# Patient Record
Sex: MALE | Race: WHITE | NOT HISPANIC OR LATINO | Employment: OTHER
[De-identification: names, ages, dates, MRNs, and addresses within clinical notes are randomized per-mention and may not be internally consistent; named-entity substitution may affect disease eponyms.]

---

## 2017-05-30 ENCOUNTER — RECORDS - HEALTHEAST (OUTPATIENT)
Dept: ADMINISTRATIVE | Facility: OTHER | Age: 37
End: 2017-05-30

## 2017-05-31 ENCOUNTER — RECORDS - HEALTHEAST (OUTPATIENT)
Dept: ADMINISTRATIVE | Facility: OTHER | Age: 37
End: 2017-05-31

## 2017-08-08 ENCOUNTER — OFFICE VISIT - HEALTHEAST (OUTPATIENT)
Dept: FAMILY MEDICINE | Facility: CLINIC | Age: 37
End: 2017-08-08

## 2017-08-08 DIAGNOSIS — M54.50 BILATERAL LOW BACK PAIN WITHOUT SCIATICA, UNSPECIFIED CHRONICITY: ICD-10-CM

## 2017-08-08 DIAGNOSIS — Z98.890 S/P LUMBAR MICRODISCECTOMY: ICD-10-CM

## 2017-11-27 ENCOUNTER — OFFICE VISIT - HEALTHEAST (OUTPATIENT)
Dept: FAMILY MEDICINE | Facility: CLINIC | Age: 37
End: 2017-11-27

## 2017-11-27 ENCOUNTER — RECORDS - HEALTHEAST (OUTPATIENT)
Dept: GENERAL RADIOLOGY | Facility: CLINIC | Age: 37
End: 2017-11-27

## 2017-11-27 ENCOUNTER — COMMUNICATION - HEALTHEAST (OUTPATIENT)
Dept: FAMILY MEDICINE | Facility: CLINIC | Age: 37
End: 2017-11-27

## 2017-11-27 DIAGNOSIS — R52 BODY ACHES: ICD-10-CM

## 2017-11-27 DIAGNOSIS — R52 PAIN, UNSPECIFIED: ICD-10-CM

## 2018-02-13 ENCOUNTER — COMMUNICATION - HEALTHEAST (OUTPATIENT)
Dept: FAMILY MEDICINE | Facility: CLINIC | Age: 38
End: 2018-02-13

## 2018-08-31 ENCOUNTER — RECORDS - HEALTHEAST (OUTPATIENT)
Dept: ADMINISTRATIVE | Facility: OTHER | Age: 38
End: 2018-08-31

## 2018-10-10 ENCOUNTER — RECORDS - HEALTHEAST (OUTPATIENT)
Dept: ADMINISTRATIVE | Facility: OTHER | Age: 38
End: 2018-10-10

## 2019-04-29 ENCOUNTER — RECORDS - HEALTHEAST (OUTPATIENT)
Dept: ADMINISTRATIVE | Facility: OTHER | Age: 39
End: 2019-04-29

## 2019-07-19 ENCOUNTER — RECORDS - HEALTHEAST (OUTPATIENT)
Dept: ADMINISTRATIVE | Facility: OTHER | Age: 39
End: 2019-07-19

## 2019-09-23 ENCOUNTER — RECORDS - HEALTHEAST (OUTPATIENT)
Dept: ADMINISTRATIVE | Facility: OTHER | Age: 39
End: 2019-09-23

## 2020-01-22 ENCOUNTER — COMMUNICATION - HEALTHEAST (OUTPATIENT)
Dept: SCHEDULING | Facility: CLINIC | Age: 40
End: 2020-01-22

## 2020-01-22 ENCOUNTER — OFFICE VISIT - HEALTHEAST (OUTPATIENT)
Dept: FAMILY MEDICINE | Facility: CLINIC | Age: 40
End: 2020-01-22

## 2020-01-22 DIAGNOSIS — R52 BODY ACHES: ICD-10-CM

## 2020-01-22 DIAGNOSIS — R05.9 COUGH: ICD-10-CM

## 2020-01-22 DIAGNOSIS — J11.1 INFLUENZA-LIKE ILLNESS: ICD-10-CM

## 2020-01-22 LAB
FLUAV AG SPEC QL IA: NORMAL
FLUBV AG SPEC QL IA: NORMAL

## 2020-01-22 ASSESSMENT — MIFFLIN-ST. JEOR: SCORE: 1692.37

## 2021-01-17 ENCOUNTER — AMBULATORY - HEALTHEAST (OUTPATIENT)
Dept: VASCULAR SURGERY | Facility: CLINIC | Age: 41
End: 2021-01-17

## 2021-01-17 DIAGNOSIS — Z20.822 COVID-19 RULED OUT: ICD-10-CM

## 2021-02-10 ENCOUNTER — AMBULATORY - HEALTHEAST (OUTPATIENT)
Dept: LAB | Facility: CLINIC | Age: 41
End: 2021-02-10

## 2021-02-10 DIAGNOSIS — Z13.9 SCREENING FOR CONDITION: ICD-10-CM

## 2021-02-10 DIAGNOSIS — Z20.822 COVID-19 RULED OUT: ICD-10-CM

## 2021-02-10 LAB
SARS-COV-2 PCR COMMENT: NORMAL
SARS-COV-2 RNA SPEC QL NAA+PROBE: NEGATIVE
SARS-COV-2 VIRUS SPECIMEN SOURCE: NORMAL

## 2021-02-11 ENCOUNTER — COMMUNICATION - HEALTHEAST (OUTPATIENT)
Dept: SCHEDULING | Facility: CLINIC | Age: 41
End: 2021-02-11

## 2021-02-13 ENCOUNTER — COMMUNICATION - HEALTHEAST (OUTPATIENT)
Dept: SCHEDULING | Facility: CLINIC | Age: 41
End: 2021-02-13

## 2021-05-31 VITALS — BODY MASS INDEX: 25.99 KG/M2 | WEIGHT: 176 LBS

## 2021-05-31 VITALS — BODY MASS INDEX: 24.81 KG/M2 | WEIGHT: 168 LBS

## 2021-06-03 ENCOUNTER — RECORDS - HEALTHEAST (OUTPATIENT)
Dept: ADMINISTRATIVE | Facility: OTHER | Age: 41
End: 2021-06-03

## 2021-06-04 VITALS
TEMPERATURE: 99.2 F | RESPIRATION RATE: 16 BRPM | DIASTOLIC BLOOD PRESSURE: 60 MMHG | OXYGEN SATURATION: 95 % | HEIGHT: 68 IN | SYSTOLIC BLOOD PRESSURE: 104 MMHG | HEART RATE: 86 BPM | BODY MASS INDEX: 26.83 KG/M2 | WEIGHT: 177 LBS

## 2021-06-05 NOTE — PROGRESS NOTES
"SUBJECTIVE:   Johny Bui is a 39 y.o. male who present complaining of flu-like symptoms: fevers, chills, myalgias, congestion, sore throat and cough for less than 1 day. Denies dyspnea or wheezing.    OBJECTIVE: /60   Pulse 86   Temp 99.2  F (37.3  C) (Oral)   Resp 16   Ht 5' 8\" (1.727 m)   Wt 177 lb (80.3 kg)   SpO2 95%   BMI 26.91 kg/m     Appears moderately ill but not toxic; temperature as noted in vitals. Ears normal. Throat and pharynx normal.  Neck supple. No adenopathy in the neck. Sinuses non tender. The chest is clear.    Flu swab negative  Cbc - declined  cxr - declined    ASSESSMENT:  Influenza like illness     PLAN:  tamiflu given to prevent spread as he has small kids. Call or return to clinic prn if these symptoms worsen or fail to improve as anticipated.    "

## 2021-06-05 NOTE — TELEPHONE ENCOUNTER
Patient states ;  Started feeling ill with a slight cough yesterday.  He woke up with a greater cough, and coughed up whitish phlegm with   Pink tint to the phlegm.    Now C/O   Body aches, shivers.  Coughing up chunks, white with pink tint.(sputum)  No fever.  Took advil 800mg  just now.  Patient asking to be seen by provider.    Appointment made at 0840 @ [facility].      Fanta Barth RN  [facility] Triage/refill nurse      Reason for Disposition    Patient wants to be seen    Protocols used: COMMON COLD-A-OH

## 2021-06-12 NOTE — PROGRESS NOTES
"Assessment/Plan:    Johny was seen today for back pain.    Diagnoses and all orders for this visit:    Bilateral low back pain without sciatica, unspecified chronicity/S/P lumbar microdiscectomy: 36-year-old man status post microdiscectomy with acute on chronic low back pain.  He does a regular regimen of home exercises and has a muscular build.  He has exacerbations of pain intermittently.  No obvious precipitant of this current exacerbation.  Reviewed the most recent notes in which she was treated for this condition which is dated 9/2016.  The patient actually received a Medrol Dosepak in November 2016 as well for other reasons.  A note was provided for him to present to the DNR in the state in which [place] be [place] to excuse his medically necessary absence.    Other orders  -     Discontinue: methylPREDNISolone (MEDROL DOSEPACK) 4 mg tablet; Take 1 tablet (4 mg total) by mouth daily. follow package directions    Robbie Rae MD  _______________________________    Chief Complaint   Patient presents with     Back Pain     x 9 days, pt states he has surgery in 2003 and 1-2 times per year \" I get a flare up in my back\"      Subjective: Johny Bui is a 36 y.o. year old male who I have seen in clinic before who presents with the following acute complaint(s):    Back pain:   - \"nerve gets jammed.\"   - usually the pain improves over three days   - duration of current episode: 9 days   - medrol dose pack helps   - pain is worse in the morning   - onset: lifting at age 19.  Again in 2012 - 2013 - treated surgically. Current: unsure.  Pain worsened.    - no saddle anesthesia, weakness, no urinary incontinence.   - planning to go [place].  Will not be going.    ROS: Complete review of systems obtained.  Pertinent items are listed above.     The following portions of the patient's history were reviewed and updated as appropriate: allergies, current medications, past medical history and problem list.   "   Objective:   /64 (Patient Site: Left Arm, Patient Position: Sitting, Cuff Size: Adult Regular)  Pulse 76  Wt 168 lb (76.2 kg)  BMI 24.81 kg/m2  General: No acute distress  MSK: Mild tenderness to palpation in the paraspinous muscles bilaterally over the levels L3 through S1.  No SI joint tenderness.  Patient has normal flexion extension at the waist.  Reflexes 2+ at the patella.  Sensation is equal in bilateral throughout his legs including his medial thighs.  Psych: Normal affect.    No results found for this or any previous visit (from the past 24 hour(s)).  No results found.    Additional History from Old Records Summarized (2): no  Decision to Obtain Records (1): no  Radiology Tests Summarized or Ordered (1): no  Labs Reviewed or Ordered (1): no  Medicine Test Summarized or Ordered (1): no  Independent Review of EKG or X-RAY(2 each): no    This note has been dictated using voice recognition software. Any grammatical or context distortions are unintentional and inherent to the software

## 2021-06-14 NOTE — PROGRESS NOTES
ASSESSMENT:  1. Body aches  Chest x-ray negative, white count normal, mono test negative.  Body aches other nonspecific symptoms, I suspect a prolonged viral illness.  - XR Chest PA and Lateral; Future  - HM2(CBC w/o Differential)  - Mononucleosis Screen      PLAN:  1.  Results of the laboratory studies and chest x-ray conveyed to the patient.  2.  Overall reassurance, I would not place the patient on any medication, watchful waiting, follow-up as needed.      Orders Placed This Encounter   Procedures     XR Chest PA and Lateral     Standing Status:   Future     Number of Occurrences:   1     Standing Expiration Date:   11/27/2018     Order Specific Question:   Can the procedure be changed per Radiologist protocol?     Answer:   Yes     HM2(CBC w/o Differential)     Mononucleosis Screen     There are no discontinued medications.    No Follow-up on file.    CHIEF COMPLAINT:  Chief Complaint   Patient presents with     URI     x 4 weeks- not going away , chest old, coughing up phelgm,fatigue and weak - ? mono       SUBJECTIVE:  Johny is a 37 y.o. male presenting to the clinic today with an upper respiratory tract infection.     Upper Respiratory Tract Infection: He has been experiencing what he describes as a chest cold for the last four weeks. He states that he has junk in his chest that he cannot seem to shake. This has been afflicting him off and on, and he has been coughing primarily when he wakes in the morning. He has also been experiencing congestion in his head. He states that he has had a general body ache and been feeling abnormally fatigued. Last night he went to bed at seven and woke up at six this morning. He denies a fever or sore throat. The symptom bothering him the most is the overall body and muscles aches. He has not been taking medication for the problem.     REVIEW OF SYSTEMS:   All other systems are negative.    PFSH:  Immunization History   Administered Date(s) Administered     DTaP, historic  1981, 02/01/1982, 04/01/1982, 08/01/1983, 05/01/1987     Hep A, historic 03/18/2010     Hep B, historic 08/11/1997, 12/22/1998, 08/31/2000     IPV 1981, 02/01/1982, 04/01/1982, 08/01/1983     MMR 01/12/1983, 10/14/1994     Td,adult,historic,unspecified 08/11/1997     Tdap 03/18/2010, 12/02/2011     Social History     Social History     Marital status:      Spouse name: N/A     Number of children: N/A     Years of education: N/A     Occupational History     Not on file.     Social History Main Topics     Smoking status: Never Smoker     Smokeless tobacco: Current User     Types: Chew     Alcohol use Not on file     Drug use: Not on file     Sexual activity: Not on file     Other Topics Concern     Not on file     Social History Narrative     No past medical history on file.  No family history on file.    MEDICATIONS:  Current Outpatient Prescriptions   Medication Sig Dispense Refill     ASACOL  mg TbEC EC tablet Take 2,400 mg by mouth 2 (two) times a day.        No current facility-administered medications for this visit.        TOBACCO USE:  History   Smoking Status     Never Smoker   Smokeless Tobacco     Current User     Types: Chew       VITALS:  Vitals:    11/27/17 1312   BP: 114/74   Pulse: 70   Weight: 176 lb (79.8 kg)     Wt Readings from Last 3 Encounters:   11/27/17 176 lb (79.8 kg)   08/08/17 168 lb (76.2 kg)   11/29/16 177 lb 9.6 oz (80.6 kg)       PHYSICAL EXAM:  Constitutional:   Reveals an alert, pleasant, talkative male.  Vitals: per nursing notes.  HEENT:  Ears:  External canals, TMs clear.    Eyes:  EOMs full, PERRL.  Lungs: Clear to A&P without rales or wheezes.  Respiratory effort normal.  Cardiac:   Regular rate and rhythm, normal S1, S2, no murmur or gallop.  Musculoskeletal: No peripheral swelling.  Neuro:  Alert and oriented. Cranial nerves, motor, sensory exams are intact.  No gross focal deficits.  Psychiatric:  Memory intact, mood appropriate.    DATA  REVIEWED:  Additional History from Old Records Summarized (2): None.  Decision to Obtain Records (1): None.  Radiology Tests Summarized or Ordered (1): Ordered chest x-ray.  Labs Reviewed or Ordered (1): Ordered labs; CBC, mono screen.   Medicine Test Summarized or Ordered (1): None.   Independent Review of EKG, X-RAY, or RAPID STREP (2 each): Review of ordered chest x-ray; negative.    The visit lasted a total of 5 minutes face to face with the patient. Over 50% of the time was spent counseling and educating the patient about upper respiratory tract infections.    IMik, am scribing for and in the presence of, Dr. Oconnor.    I, Dr. Oconnor, personally performed the services described in this documentation, as scribed by Mik Jimenes in my presence, and it is both accurate and complete.    Total data points: 4

## 2021-08-07 ENCOUNTER — HEALTH MAINTENANCE LETTER (OUTPATIENT)
Age: 41
End: 2021-08-07

## 2021-09-26 ENCOUNTER — HEALTH MAINTENANCE LETTER (OUTPATIENT)
Age: 41
End: 2021-09-26

## 2021-11-08 ENCOUNTER — OFFICE VISIT (OUTPATIENT)
Dept: FAMILY MEDICINE | Facility: CLINIC | Age: 41
End: 2021-11-08
Payer: COMMERCIAL

## 2021-11-08 VITALS
DIASTOLIC BLOOD PRESSURE: 72 MMHG | HEART RATE: 64 BPM | TEMPERATURE: 98.2 F | SYSTOLIC BLOOD PRESSURE: 118 MMHG | RESPIRATION RATE: 12 BRPM | HEIGHT: 68 IN | WEIGHT: 169.9 LBS | BODY MASS INDEX: 25.75 KG/M2

## 2021-11-08 DIAGNOSIS — Z11.59 NEED FOR HEPATITIS C SCREENING TEST: ICD-10-CM

## 2021-11-08 DIAGNOSIS — Z01.818 PREOP GENERAL PHYSICAL EXAM: ICD-10-CM

## 2021-11-08 DIAGNOSIS — Z11.4 SCREENING FOR HIV (HUMAN IMMUNODEFICIENCY VIRUS): ICD-10-CM

## 2021-11-08 DIAGNOSIS — Z00.00 ENCOUNTER FOR ANNUAL PHYSICAL EXAM: Primary | ICD-10-CM

## 2021-11-08 DIAGNOSIS — Z13.1 DIABETES MELLITUS SCREENING: ICD-10-CM

## 2021-11-08 DIAGNOSIS — Z13.220 SCREENING FOR HYPERLIPIDEMIA: ICD-10-CM

## 2021-11-08 DIAGNOSIS — M54.50 BILATERAL LOW BACK PAIN WITHOUT SCIATICA, UNSPECIFIED CHRONICITY: ICD-10-CM

## 2021-11-08 PROBLEM — K51.90 ULCERATIVE COLITIS (H): Status: ACTIVE | Noted: 2021-11-08

## 2021-11-08 LAB
ANION GAP SERPL CALCULATED.3IONS-SCNC: 12 MMOL/L (ref 5–18)
BUN SERPL-MCNC: 11 MG/DL (ref 8–22)
CALCIUM SERPL-MCNC: 10.1 MG/DL (ref 8.5–10.5)
CHLORIDE BLD-SCNC: 101 MMOL/L (ref 98–107)
CHOLEST SERPL-MCNC: 180 MG/DL
CO2 SERPL-SCNC: 26 MMOL/L (ref 22–31)
CREAT SERPL-MCNC: 0.86 MG/DL (ref 0.7–1.3)
FASTING STATUS PATIENT QL REPORTED: YES
GFR SERPL CREATININE-BSD FRML MDRD: >90 ML/MIN/1.73M2
GLUCOSE BLD-MCNC: 97 MG/DL (ref 70–125)
HDLC SERPL-MCNC: 80 MG/DL
HGB BLD-MCNC: 13.9 G/DL (ref 13.3–17.7)
HIV 1+2 AB+HIV1 P24 AG SERPL QL IA: NEGATIVE
LDLC SERPL CALC-MCNC: 87 MG/DL
POTASSIUM BLD-SCNC: 3.9 MMOL/L (ref 3.5–5)
SODIUM SERPL-SCNC: 139 MMOL/L (ref 136–145)
TRIGL SERPL-MCNC: 65 MG/DL

## 2021-11-08 PROCEDURE — 86803 HEPATITIS C AB TEST: CPT | Performed by: FAMILY MEDICINE

## 2021-11-08 PROCEDURE — 36415 COLL VENOUS BLD VENIPUNCTURE: CPT | Performed by: FAMILY MEDICINE

## 2021-11-08 PROCEDURE — 99214 OFFICE O/P EST MOD 30 MIN: CPT | Performed by: FAMILY MEDICINE

## 2021-11-08 PROCEDURE — 80048 BASIC METABOLIC PNL TOTAL CA: CPT | Performed by: FAMILY MEDICINE

## 2021-11-08 PROCEDURE — 80061 LIPID PANEL: CPT | Performed by: FAMILY MEDICINE

## 2021-11-08 PROCEDURE — 85018 HEMOGLOBIN: CPT | Performed by: FAMILY MEDICINE

## 2021-11-08 PROCEDURE — U0003 INFECTIOUS AGENT DETECTION BY NUCLEIC ACID (DNA OR RNA); SEVERE ACUTE RESPIRATORY SYNDROME CORONAVIRUS 2 (SARS-COV-2) (CORONAVIRUS DISEASE [COVID-19]), AMPLIFIED PROBE TECHNIQUE, MAKING USE OF HIGH THROUGHPUT TECHNOLOGIES AS DESCRIBED BY CMS-2020-01-R: HCPCS | Performed by: FAMILY MEDICINE

## 2021-11-08 PROCEDURE — U0005 INFEC AGEN DETEC AMPLI PROBE: HCPCS | Performed by: FAMILY MEDICINE

## 2021-11-08 PROCEDURE — 87389 HIV-1 AG W/HIV-1&-2 AB AG IA: CPT | Performed by: FAMILY MEDICINE

## 2021-11-08 RX ORDER — MESALAMINE 1.2 G/1
TABLET, DELAYED RELEASE ORAL
COMMUNITY
Start: 2021-09-27

## 2021-11-08 SDOH — ECONOMIC STABILITY: TRANSPORTATION INSECURITY
IN THE PAST 12 MONTHS, HAS THE LACK OF TRANSPORTATION KEPT YOU FROM MEDICAL APPOINTMENTS OR FROM GETTING MEDICATIONS?: PATIENT DECLINED

## 2021-11-08 SDOH — ECONOMIC STABILITY: FOOD INSECURITY: WITHIN THE PAST 12 MONTHS, THE FOOD YOU BOUGHT JUST DIDN'T LAST AND YOU DIDN'T HAVE MONEY TO GET MORE.: PATIENT DECLINED

## 2021-11-08 SDOH — HEALTH STABILITY: PHYSICAL HEALTH: ON AVERAGE, HOW MANY MINUTES DO YOU ENGAGE IN EXERCISE AT THIS LEVEL?: 50 MIN

## 2021-11-08 SDOH — ECONOMIC STABILITY: INCOME INSECURITY: HOW HARD IS IT FOR YOU TO PAY FOR THE VERY BASICS LIKE FOOD, HOUSING, MEDICAL CARE, AND HEATING?: PATIENT DECLINED

## 2021-11-08 SDOH — HEALTH STABILITY: PHYSICAL HEALTH: ON AVERAGE, HOW MANY DAYS PER WEEK DO YOU ENGAGE IN MODERATE TO STRENUOUS EXERCISE (LIKE A BRISK WALK)?: 5 DAYS

## 2021-11-08 SDOH — ECONOMIC STABILITY: INCOME INSECURITY: IN THE LAST 12 MONTHS, WAS THERE A TIME WHEN YOU WERE NOT ABLE TO PAY THE MORTGAGE OR RENT ON TIME?: PATIENT REFUSED

## 2021-11-08 SDOH — ECONOMIC STABILITY: FOOD INSECURITY: WITHIN THE PAST 12 MONTHS, YOU WORRIED THAT YOUR FOOD WOULD RUN OUT BEFORE YOU GOT MONEY TO BUY MORE.: PATIENT DECLINED

## 2021-11-08 SDOH — ECONOMIC STABILITY: TRANSPORTATION INSECURITY
IN THE PAST 12 MONTHS, HAS LACK OF TRANSPORTATION KEPT YOU FROM MEETINGS, WORK, OR FROM GETTING THINGS NEEDED FOR DAILY LIVING?: PATIENT DECLINED

## 2021-11-08 ASSESSMENT — LIFESTYLE VARIABLES
HOW OFTEN DO YOU HAVE A DRINK CONTAINING ALCOHOL: PATIENT DECLINED
HOW MANY STANDARD DRINKS CONTAINING ALCOHOL DO YOU HAVE ON A TYPICAL DAY: PATIENT DECLINED
HOW OFTEN DO YOU HAVE SIX OR MORE DRINKS ON ONE OCCASION: PATIENT DECLINED

## 2021-11-08 ASSESSMENT — SOCIAL DETERMINANTS OF HEALTH (SDOH)
ARE YOU MARRIED, WIDOWED, DIVORCED, SEPARATED, NEVER MARRIED, OR LIVING WITH A PARTNER?: PATIENT DECLINED
IN A TYPICAL WEEK, HOW MANY TIMES DO YOU TALK ON THE PHONE WITH FAMILY, FRIENDS, OR NEIGHBORS?: PATIENT DECLINED
DO YOU BELONG TO ANY CLUBS OR ORGANIZATIONS SUCH AS CHURCH GROUPS UNIONS, FRATERNAL OR ATHLETIC GROUPS, OR SCHOOL GROUPS?: PATIENT DECLINED
HOW OFTEN DO YOU GET TOGETHER WITH FRIENDS OR RELATIVES?: PATIENT DECLINED
HOW OFTEN DO YOU ATTEND CHURCH OR RELIGIOUS SERVICES?: PATIENT DECLINED

## 2021-11-08 ASSESSMENT — MIFFLIN-ST. JEOR: SCORE: 1655.16

## 2021-11-08 NOTE — PROGRESS NOTES
[address]  [facility] [address]  Phone: [phone]  Fax: [phone]  Primary Provider: Charles Obrien.  Pre-op Performing Provider: CHARLES OBRIEN    PREOPERATIVE EVALUATION:  Today's date: 11/8/2021    Johny Bui is a 41 year old male who presents for a preoperative evaluation.    Surgical Information:  Surgery/Procedure: Discectomy L3-4, Left  Surgery Location: [facility]  Surgeon: Dr. Aguirre  Surgery Date: 11/11/21  Time of Surgery: Unknown  Where patient plans to recover: At home with family  Fax number for surgical facility:  [phone]    Type of Anesthesia Anticipated: to be determined    Assessment & Plan     The proposed surgical procedure is considered INTERMEDIATE risk.    Problem List Items Addressed This Visit        Nervous and Auditory    Bilateral low back pain without sciatica, unspecified chronicity    Relevant Orders    Basic metabolic panel (Completed)    Hemoglobin (Completed)    Asymptomatic COVID-19 Virus (Coronavirus) by PCR      Other Visit Diagnoses     Encounter for annual physical exam    -  Primary    Here for annual physical. But will document on preop form due to upcoming surgery.    Preop general physical exam        Relevant Orders    Basic metabolic panel (Completed)    Hemoglobin (Completed)    Asymptomatic COVID-19 Virus (Coronavirus) by PCR    Screening for HIV (human immunodeficiency virus)        As per USPSTF guidelines    Relevant Orders    HIV Antigen Antibody Combo (Completed)    Need for hepatitis C screening test        As per USPSTF guidelines    Relevant Orders    Hepatitis C Screen Reflex to HCV RNA Quant and Genotype    Screening for hyperlipidemia        Relevant Orders    Lipid panel reflex to direct LDL Non-fasting (Completed)    Diabetes mellitus screening        Relevant Orders    Basic metabolic panel (Completed)               Risks and Recommendations:  The patient  has the following additional risks and recommendations for perioperative complications:   - No identified additional risk factors other than previously addressed    Medication Instructions:  Hold mesalamine the morning of surgery and resume the following day    RECOMMENDATION:  APPROVAL GIVEN to proceed with proposed procedure pending review of diagnostic evaluation.      Subjective     HPI related to upcoming procedure: Longstanding history of lower back pain and known disc problems with compression or nerve.  Recent worsening approximately 5 weeks ago after having a very hard sneeze.  Since then radiation down the side.  Has had similar procedure on the right side with very good success.    Preop Questions 11/8/2021   1. Have you ever had a heart attack or stroke? No   2. Have you ever had surgery on your heart or blood vessels, such as a stent placement, a coronary artery bypass, or surgery on an artery in your head, neck, heart, or legs? No   3. Do you have chest pain with activity? No   4. Do you have a history of  heart failure? No   5. Do you currently have a cold, bronchitis or symptoms of other infection? No   6. Do you have a cough, shortness of breath, or wheezing? No   7. Do you or anyone in your family have previous history of blood clots? UNKNOWN -no personal history   8. Do you or does anyone in your family have a serious bleeding problem such as prolonged bleeding following surgeries or cuts? UNKNOWN -no personal problem   9. Have you ever had problems with anemia or been told to take iron pills? No   10. Have you had any abnormal blood loss such as black, tarry or bloody stools?   No   11. Have you ever had a blood transfusion? No   12. Are you willing to have a blood transfusion if it is medically needed before, during, or after your surgery? NO - PLEASE NOTE, NO PERMISSION FOR BLOOD TRANSFUSION   13. Have you or any of your relatives ever had problems with anesthesia? No   14. Do you have sleep  apnea, excessive snoring or daytime drowsiness? No   15. Do you have any artifical heart valves or other implanted medical devices like a pacemaker, defibrillator, or continuous glucose monitor? No   16. Do you have artificial joints? No   17. Are you allergic to latex? No       Health Care Directive:  Patient does not have a Health Care Directive or Living Will: Discussed advance care planning with patient; information given to patient to review.    Preoperative Review of :   reviewed - no record of controlled substances prescribed.    Status of Chronic Conditions:  History of ulcerative colitis.  Been under very good control with current treatment modality with no flares in the past 10 years    Review of Systems   All other systems reviewed and are negative.        Patient Active Problem List    Diagnosis Date Noted     Ulcerative colitis (H) 11/08/2021     Priority: Medium     Formatting of this note might be different from the original.  Created by Conversion    Replacement Utility updated for latest IMO load       Bilateral low back pain without sciatica, unspecified chronicity 08/08/2017     Priority: Medium     CARDIOVASCULAR SCREENING; LDL GOAL LESS THAN 160 10/31/2010     Priority: Medium     External hemorrhoids 12/16/2009     Priority: Medium      Past Medical History:   Diagnosis Date     External hemorrhoids 12/16/2009     Past Surgical History:   Procedure Laterality Date     LUMBAR DISCECTOMY Right 2013     WISDOM TOOTH EXTRACTION       Current Outpatient Medications   Medication Sig Dispense Refill     mesalamine (LIALDA) 1.2 g EC tablet TAKE TWO TABLETS BY MOUTH DAILY with a meal         No Known Allergies     Social History     Tobacco Use     Smoking status: Never Smoker     Smokeless tobacco: Current User     Types: Chew   Substance Use Topics     Alcohol use: Yes     Alcohol/week: 7.0 standard drinks     Types: 7 Glasses of wine per week       History   Drug Use Unknown         Objective  "    /72 (BP Location: Left arm, Patient Position: Sitting, Cuff Size: Adult Large)   Pulse 64   Temp 98.2  F (36.8  C) (Oral)   Resp 12   Ht 1.727 m (5' 8\")   Wt 77.1 kg (169 lb 14.4 oz)   BMI 25.83 kg/m      Physical Exam  Vitals and nursing note reviewed.   Constitutional:       General: He is not in acute distress.     Appearance: Normal appearance.   HENT:      Head: Normocephalic and atraumatic.      Right Ear: Tympanic membrane, ear canal and external ear normal.      Left Ear: Tympanic membrane, ear canal and external ear normal.      Nose: Nose normal.      Mouth/Throat:      Mouth: Mucous membranes are moist.      Pharynx: Oropharynx is clear. No oropharyngeal exudate.   Eyes:      General: No scleral icterus.     Extraocular Movements: Extraocular movements intact.      Conjunctiva/sclera: Conjunctivae normal.   Neck:      Vascular: No carotid bruit.   Cardiovascular:      Rate and Rhythm: Normal rate and regular rhythm.      Pulses: Normal pulses.      Heart sounds: Normal heart sounds. No murmur heard.  No friction rub. No gallop.    Pulmonary:      Effort: Pulmonary effort is normal.      Breath sounds: Normal breath sounds. No wheezing, rhonchi or rales.   Musculoskeletal:         General: No swelling. Normal range of motion.      Cervical back: Normal range of motion.      Right lower leg: No edema.      Left lower leg: No edema.   Skin:     General: Skin is warm and dry.      Capillary Refill: Capillary refill takes less than 2 seconds.      Coloration: Skin is not jaundiced.      Findings: No rash.   Neurological:      General: No focal deficit present.      Mental Status: He is alert and oriented to person, place, and time.      Cranial Nerves: No cranial nerve deficit.      Gait: Gait is intact. Gait normal.      Deep Tendon Reflexes:      Reflex Scores:       Bicep reflexes are 2+ on the right side and 2+ on the left side.       Patellar reflexes are 2+ on the right side and 2+ on the " left side.  Psychiatric:         Mood and Affect: Mood normal.         Thought Content: Thought content normal.           Diagnostics:  Recent Results (from the past 48 hour(s))   HIV Antigen Antibody Combo    Collection Time: 11/08/21  2:27 PM   Result Value Ref Range    HIV Antigen Antibody Combo Negative Negative   Lipid panel reflex to direct LDL Non-fasting    Collection Time: 11/08/21  2:27 PM   Result Value Ref Range    Cholesterol 180 <=199 mg/dL    Triglycerides 65 <=149 mg/dL    Direct Measure HDL 80 >=40 mg/dL    LDL Cholesterol Calculated 87 <=129 mg/dL    Patient Fasting > 8hrs? Yes    Basic metabolic panel    Collection Time: 11/08/21  2:27 PM   Result Value Ref Range    Sodium 139 136 - 145 mmol/L    Potassium 3.9 3.5 - 5.0 mmol/L    Chloride 101 98 - 107 mmol/L    Carbon Dioxide (CO2) 26 22 - 31 mmol/L    Anion Gap 12 5 - 18 mmol/L    Urea Nitrogen 11 8 - 22 mg/dL    Creatinine 0.86 0.70 - 1.30 mg/dL    Calcium 10.1 8.5 - 10.5 mg/dL    Glucose 97 70 - 125 mg/dL    GFR Estimate >90 >60 mL/min/1.73m2   Hemoglobin    Collection Time: 11/08/21  2:27 PM   Result Value Ref Range    Hemoglobin 13.9 13.3 - 17.7 g/dL      No EKG required, no history of coronary heart disease, significant arrhythmia, peripheral arterial disease or other structural heart disease.    Revised Cardiac Risk Index (RCRI):  The patient has the following serious cardiovascular risks for perioperative complications:   - No serious cardiac risks = 0 points     RCRI Interpretation: 0 points: Class I (very low risk - 0.4% complication rate)           Signed Electronically by: Joaquin Marvin DO  Copy of this evaluation report is provided to requesting physician.

## 2021-11-08 NOTE — PATIENT INSTRUCTIONS

## 2021-11-09 LAB
HCV AB SERPL QL IA: NONREACTIVE
SARS-COV-2 RNA RESP QL NAA+PROBE: NEGATIVE

## 2023-01-14 ENCOUNTER — HEALTH MAINTENANCE LETTER (OUTPATIENT)
Age: 43
End: 2023-01-14

## 2024-02-11 ENCOUNTER — HEALTH MAINTENANCE LETTER (OUTPATIENT)
Age: 44
End: 2024-02-11